# Patient Record
Sex: MALE | Race: ASIAN | NOT HISPANIC OR LATINO | Employment: FULL TIME | URBAN - METROPOLITAN AREA
[De-identification: names, ages, dates, MRNs, and addresses within clinical notes are randomized per-mention and may not be internally consistent; named-entity substitution may affect disease eponyms.]

---

## 2024-03-20 ENCOUNTER — DOCUMENTATION (OUTPATIENT)
Dept: URGENT CARE | Facility: CLINIC | Age: 44
End: 2024-03-20

## 2024-03-20 NOTE — ED NOTES
Pt. Preferred language was [place]ic w/ minimal english proficiency. During clinical intake, and the provider visit we utilized the language line. The pt. Was advised to go to the ER, and refused to go against medical advice.     At discharge the Ipad that we use for the language line  and would not hold a charge. I called the language line on my work phone. We were able to print the majority of the discharge paperwork in [place]ic. We were not able to print the AMA paperwork in [place]ic. I would read a portion of the AMA paperwork to the pt., and the  would translate. We did this for the entirety of the Request for Emergency Medical Transport offered, as well as the Refusal of medical Examination/Treatment/Emergency Transportation.     The pt. Communicated through the  that he was refusing to go to the emergency room, and wanted directions to the [facility] where his medication was sent. I asked the pt. To sign next to the portions on the AMA paperwork where we discussed. After he did this, I gave him direction to the [facility] and told him that he was good to go unless he had questions. He got up, and left the room.